# Patient Record
Sex: FEMALE | Race: BLACK OR AFRICAN AMERICAN | NOT HISPANIC OR LATINO | Employment: STUDENT | URBAN - METROPOLITAN AREA
[De-identification: names, ages, dates, MRNs, and addresses within clinical notes are randomized per-mention and may not be internally consistent; named-entity substitution may affect disease eponyms.]

---
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
## 2023-09-26 ENCOUNTER — OFFICE VISIT (OUTPATIENT)
Dept: URGENT CARE | Facility: CLINIC | Age: 18
End: 2023-09-26

## 2023-09-26 VITALS
WEIGHT: 128 LBS | RESPIRATION RATE: 15 BRPM | HEIGHT: 63 IN | SYSTOLIC BLOOD PRESSURE: 100 MMHG | BODY MASS INDEX: 22.68 KG/M2 | HEART RATE: 79 BPM | DIASTOLIC BLOOD PRESSURE: 68 MMHG | TEMPERATURE: 98 F | OXYGEN SATURATION: 97 %

## 2023-09-26 DIAGNOSIS — B34.9 VIRAL SYNDROME: Primary | ICD-10-CM

## 2023-09-26 PROCEDURE — 99499 UNLISTED E&M SERVICE: CPT | Mod: S$GLB,,, | Performed by: PHYSICIAN ASSISTANT

## 2023-09-26 PROCEDURE — 99499 NO LOS: ICD-10-PCS | Mod: S$GLB,,, | Performed by: PHYSICIAN ASSISTANT

## 2023-09-26 RX ORDER — FLUTICASONE PROPIONATE 50 MCG
1 SPRAY, SUSPENSION (ML) NASAL DAILY
Qty: 9.9 ML | Refills: 0 | Status: SHIPPED | OUTPATIENT
Start: 2023-09-26 | End: 2023-10-06

## 2023-09-26 NOTE — PROGRESS NOTES
"Subjective:      Patient ID: Carmen Conklin is a 17 y.o. female.    Vitals:  height is 5' 3" (1.6 m) and weight is 58.1 kg (128 lb). Her temperature is 98 °F (36.7 °C). Her blood pressure is 100/68 and her pulse is 79. Her respiration is 15 and oxygen saturation is 97%.     Chief Complaint: Dizziness    [employer] student: Pt states she has had sinus congestion, ear pressure, runny nose, and cough for 3 days. She has tried dayquil and nyquil with minimal relief. Pt reports feeling dizzy while working out yesterday. She denies dizziness at this time.     Sinus Problem  This is a new problem. The current episode started today. The problem is unchanged. There has been no fever. Her pain is at a severity of 2/10. The pain is mild. Associated symptoms include congestion, coughing, ear pain ("pressure"), headaches, sneezing and a sore throat. Pertinent negatives include no chills, diaphoresis, hoarse voice, neck pain, shortness of breath, sinus pressure or swollen glands. Past treatments include oral decongestants. The treatment provided mild relief.       Constitution: Negative for chills, sweating, fatigue and fever.   HENT:  Positive for ear pain ("pressure"), congestion, postnasal drip and sore throat. Negative for sinus pain and sinus pressure.    Neck: Negative for neck pain, neck stiffness and painful lymph nodes.   Cardiovascular:  Negative for chest pain, palpitations and sob on exertion.   Eyes:  Negative for eye discharge, eye itching and eye pain.   Respiratory:  Positive for cough. Negative for sputum production and shortness of breath.    Gastrointestinal:  Negative for abdominal pain, nausea, vomiting and diarrhea.   Genitourinary:  Negative for dysuria, hematuria and pelvic pain.   Musculoskeletal:  Negative for pain, muscle cramps and muscle ache.   Skin:  Negative for pale, rash and wound.   Allergic/Immunologic: Positive for sneezing.   Neurological:  Positive for headaches. Negative for dizziness and " light-headedness.   Hematologic/Lymphatic: Negative for swollen lymph nodes.      Objective:     Physical Exam   Constitutional: She is oriented to person, place, and time. She appears well-developed. She is cooperative.  Non-toxic appearance. She does not appear ill. No distress.   HENT:   Head: Normocephalic and atraumatic.   Ears:   Right Ear: External ear and ear canal normal. A middle ear effusion (clear) is present.   Left Ear: External ear and ear canal normal. A middle ear effusion (clear) is present.   Nose: Mucosal edema present. No rhinorrhea. Right sinus exhibits no maxillary sinus tenderness and no frontal sinus tenderness. Left sinus exhibits no maxillary sinus tenderness and no frontal sinus tenderness.   Mouth/Throat: Uvula is midline and mucous membranes are normal. Posterior oropharyngeal erythema and cobblestoning present. No oropharyngeal exudate or posterior oropharyngeal edema.   Eyes: Conjunctivae, EOM and lids are normal. Right eye exhibits no discharge. Left eye exhibits no discharge. No scleral icterus.   Neck: Trachea normal and phonation normal. Neck supple.   Cardiovascular: Normal rate, regular rhythm and normal heart sounds.   No murmur heard.Exam reveals no gallop.   Pulmonary/Chest: Effort normal and breath sounds normal. No respiratory distress. She has no decreased breath sounds. She has no wheezes. She has no rhonchi. She has no rales.   Musculoskeletal:         General: No deformity or edema.   Lymphadenopathy:        Head (right side): No submandibular and no tonsillar adenopathy present.        Head (left side): No submandibular and no tonsillar adenopathy present.   Neurological: She is alert and oriented to person, place, and time. Coordination normal.   Skin: Skin is warm, dry, intact, not diaphoretic and not pale.   Psychiatric: Her speech is normal and behavior is normal. Judgment and thought content normal.   Nursing note and vitals reviewed.      Assessment:     1. Viral  syndrome        Plan:       Viral syndrome  -     fluticasone propionate (FLONASE) 50 mcg/actuation nasal spray; 1 spray (50 mcg total) by Each Nostril route once daily. for 10 days  Dispense: 9.9 mL; Refill: 0          Medical Decision Making:   Urgent Care Management:  Middle ear effusion bilaterally. Treating with flonase. Discussed over-the-counter medication options patient can take for symptomatic relief. Return precautions discussed.         Patient Instructions   Below are over-the-counter suggestions and recommendations for symptomatic relief:     -Make sure to stay well hydrated.   -NASAL SALINE reduces dryness and can mechanically move any post-nasal drip from your eustachian tube or from the back of your throat.   -WARM SALTWATER GARGLES to soothe throat pain (1/2 tsp salt to 1 cup warm water; gargle as needed)   -ANTIHISTAMINES (I.e. claritin, zyrtec, allegra) will help relieve itching/sneezing/runny nose, but wont relieve nasal congestion. Be aware benadryl may cause drowsiness.   -PSEUDOEPHEDRINE (behind the counter) can help with congestion (30 mg up to 240 mg/day). Be aware this medication can cause elevated blood pressure and palpitations.  -MUCINEX (guaifenesin) to break up mucous - you can take up to 2400 mg/day. Mucinex DM pill has a cough suppressant that can be sedating. You can use this at night to stop the tickle at the back of your throat. You can use Mucinex D (it has guaifenesin and a high dose of pseudoephedrine) in the mornings to help decongest.  -AFRIN in each nostril for nasal congestion. Use no longer than 3 days, as it is addictive. It can dry out your mucous membranes and cause elevated blood pressure. *Useful for when flying!*   -FLONASE 1-2 sprays/nostril per day. It is a local acting steroid nasal spray. If you develop a bloody nose, stop using the medication immediately.  -NYQUIL at night to help with rest. This contains an antihistamine and tylenol.   -HONEY is a natural  cough suppressant that can be used.  -TYLENOL up to 4,000 mg a day is safe for short periods and can be used for body aches, pain, and fever; however, in high doses and prolonged use it can cause liver irritation.  -IBUPROFEN is a non-steroidal anti-inflammatory that can be used for body aches, pain, and fever; however, it can also cause stomach irritation if over used.   -CHLORASEPTIC SPRAY also helps to numb throat pain.  -Warm face compresses to help with facial sinus pain/pressure.      If you DO NOT have Hypertension or any history of palpitations, it is ok to take over the counter Sudafed, Mucinex D, Allegra-D, Claritin-D, or Zyrtec-D.  It is ok to combine one of the above with PLAIN over the counter antihistamine. If, for example, you are taking Zyrtec -D, you can combine with PLAIN Mucinex, but not Mucinex-D.  If you are taking Mucinex-D, you can combine that with PLAIN Allegra or Claritin or Zyrtec.     If you DO have Hypertension or palpitations, it is safe to take CORICIDIN for relief of sinus symptoms.      Please follow up with your primary care provider within 2-5 days if your signs and symptoms have not resolved or worsen.     If your condition worsens or fails to improve we recommend that you receive another evaluation at the emergency room immediately or contact your primary medical clinic to discuss your concerns.   You must understand that you have received an Urgent Care treatment only and that you may be released before all of your medical problems are known or treated. You, the patient, will arrange for follow up care as instructed.